# Patient Record
(demographics unavailable — no encounter records)

---

## 2024-12-18 NOTE — PHYSICAL EXAM
[de-identified] : Constitutional o Appearance : well-nourished, well developed, alert, in no acute distress  Head and Face o Head :  Inspection : atraumatic, normocephalic o Face :  Inspection : no visible rash or discoloration Respiratory o Respiratory Effort: breathing unlabored  Neurologic o Mental Status Examination :  Orientation : alert and oriented X 3 Psychiatric o Mood and Affect: mood normal, affect appropriate Cardiovascular o Observation/Palpation : - no swelling Lymphatic o Additional Nodes : No palpable lymph nodes present  Right Lower Extremity o Buttock : no tenderness, swelling or deformities  o Right Hip :  Inspection/Palpation : no tenderness, swelling or deformities  Range of Motion : full and painless in all planes, no crepitance  Stability : joint stability intact  Strength : extension, flexion, adduction, abduction, internal rotation and external rotation 5/5   o Right Knee :  Inspection/Palpation :  medial compartment tenderness and lateral tenderness to palpation, no swelling  Range of Motion : 3-100, active flexion and extension full and painless, no crepitance decreased patellofemoral glide   Stability : mild valgus or varus instability present on provocative testing  Strength : flexion and extension 5/5  Tests and Signs : negative Anterior Drawer, negative Lachman, negative Oscar  Left Lower Extremity o Buttock : no tenderness, swelling or deformities  o Left Hip :  Inspection/Palpation : no tenderness, no swelling or deformities  Range of Motion : full and painless in all planes, no crepitance  Stability : joint stability intact  Strength : extension, flexion, adduction, abduction, internal rotation and external rotation 5/5  o Left Knee :  Inspection/Palpation : medial compartment  tenderness to palpation, no swelling  Range of Motion : 0-60   with pain active flexion and extension full, no crepitance  Stability : no valgus or varus instability present on provocative testing  Strength : flexion and extension 5/5  Tests and Signs : negative Anterior Drawer, negative Lachman, negative Oscar  o Peripheral Vascular System :  Dorsalis Pedis Artery : pulse 2+  Posterior Tibial Artery : pulse 2+   Gait and Station: Gait: trophic changes of both lower extremities, no foot drop, no significant extremity swelling or lymphedema, good proprioception and balance, varus thrust bilaterally right side worse than left.  o Knee injection : Indication- knee osteoarthritis, Anatomic location- right intra-articular joint space, Spray - area was sterilized with Betadine and alcohol and anesthetized with Ethyl Chloride , needle used-20G, Medications given- 6cc's Synvisc-One under Ultrasound guidance. The patient tolerated the procedure well.

## 2024-12-18 NOTE — ADDENDUM
[FreeTextEntry1] : I, HOMERO BARBOSA, acted solely as a scribe for Dr. Eran Drew on this date 12/18/2024.  All medical record entries made by the Scribe were at my, Dr. Eran Drew, direction and personally dictated by me on 12/18/2024. I have reviewed the chart and agree that the record accurately reflects my personal performance of the history, physical exam, assessment and plan. I have also personally directed, reviewed, and agreed with the chart.

## 2024-12-18 NOTE — DISCUSSION/SUMMARY
[de-identified] : I went over the pathophysiology of the patient's symptoms in great detail with the patient. I discussed the underlying pathophysiology of the patient's condition in great detail with the patient. I went over the patient's x-rays with them in great detail. I informed the patient that surgery will be required to provide them long term relief from their symptoms. The proper pre and post operative procedures and expectations were discussed in extensive detail with the patient. We had a lengthy discussion about the patient's issues, and talked about the benefits and risks of the procedure. I provided the names of 3 doctors. The patient elected to receive a Synvisc-One injection into her right knee today, and tolerated it well. I instructed the patient on ROM exercises, and told them to take it easy. The use of ice and rest was reviewed with the patient. The patient may resume activities tomorrow. I reminded the patient that it takes 4 to 6 weeks after the final injection to feel symptom relief.   All of their questions were answered. They understand and consent to the plan.   FU in 6 weeks.   The patient is an 66 year old female with bone on bone arthritis of their [right / left /bilateral] knee. Based upon the patient's continued symptoms and failure to respond to conservative treatment I have recommended a total knee replacement for this patient. A long discussion took place with the patient describing what a total joint replacement is and what the procedure would entail. A total knee model, similar to the implant that will be used during the operation was utilized to demonstrate and to discuss the various bearing surfaces of the implants. The hospitalization and post-operative care and rehabilitation were also discussed. The use of perioperative antibiotics and DVT prophylaxis were discussed. The risk, benefits and alternatives to a surgical intervention were discussed at length with the patient. The patient was also advised of risks related to the medical comorbidities and elevated body mass index (BMI). A lengthy discussion took place to review the most common complications including but not limited to: deep vein thrombosis, pulmonary embolus, heart attack, stroke, infection, wound breakdown, numbness, damage to nerves, tendon, muscles, arteries or other blood vessels, death and other possible complications from anesthesia. The patient was told that we will take steps to minimize these risks by using sterile technique, antibiotics and DVT prophylaxis when appropriate and follow the patient postoperatively in the office setting to monitor progress. The possibility of recurrent pain, no improvement in pain and actual worsening of pain were also discussed with the patient.  The discharge plan of care focused on the patient going home following surgery. I encouraged the patient to make the necessary arrangements to have someone stay with them when they are discharged home. Following discharge, a home care nurse will visit the patient. They will open your home care case and request home physical therapy services. Home physical therapy will commence following discharge provided it is appropriate and covered by her health insurance benefit plan.  The risks, benefits and alternative treatment options of total joint replacement were reviewed with the patient at great length. All questions were answered to the satisfaction of the patient. The patient participated in an interactive discussion of the total knee replacement implant planned for their surgery with questions answered. The patient agreed with the treatment plan, and has decided to move forward with the elective total knee replacement as planned.  VIKA PARSONS was seen face to face and needs a commode for bedside use as the patient has no ability to acces the bathroom in their home. The patient also requires a rolling walker as this is needed for activities of daily living within their home secondary to the diagnosis of osteoarthritis.

## 2024-12-18 NOTE — HISTORY OF PRESENT ILLNESS
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 She states her right knee is feeling better. She is s/p right knee cortisone injection on 11/21/2024 and reports relief for a couple of weeks. She states the right knee is worse than the left knee. She denies any swelling or buckling. She understands she needs to a right TKR. She has restricted motion of her bilateral knees.  She has been approved for Synvisc-One.  Her left knee does not hurt but has severe restriction of motion.

## 2025-01-22 NOTE — DISCUSSION/SUMMARY
[de-identified] : 66 year female old with right and left knee osteoarthritis. We discussed at length the nature of the patient's condition. We discussed all options and conservative measures, such as ice, NSAIDs, physical therapy, exercise limitations, and injections.  Discussed NSAIDs prn. Recommended use of a cane.   Recommended home exercise program.  I discussed with patient that if and when patient feels quality of life is significant compromised and unresponsive to conservative management patient will be considered a candidate for total hip replacement.  Follow up 6 months  The patient understood and verbally agreed to the treatment plan. All of their questions were answered. The patient should call the office if they have any questions or experience worsening symptoms.

## 2025-01-22 NOTE — ADDENDUM
[FreeTextEntry1] : I, Jovita Estevez, acted solely as a scribe for Dr. Vega Galan MD on this date  01/22/2025  All medical record entries made by the Scribe were at my, Dr. Vega Galan MD , direction and personally dictated by me on 01/22/2025. I have reviewed the chart and agree that the record accurately reflects my personal performance of the history, physical exam, assessment and plan. I have also personally directed, reviewed, and agreed with the chart.

## 2025-01-22 NOTE — PHYSICAL EXAM
[de-identified] : Constitutional: Well nourished , well developed and in no acute distress Psychiatric: Alert and oriented to time place and person.Appropriate affect . Skin: Head, neck, arms and lower extremities:no lesions or discoloration HEENT: Normocephalic, EOM intact, Nasal septum midline, Respiratory: Unlabored respirations,no audible wheezing ,no tachypnea, no cyanosis Cardiovascular: No leg swelling no ankle edema no JVD, pulse regular Vascular: No calf or thigh tenderness, Peripheral pulses: intact Lymphatics: No groin adenopathy,no lymphedema lower or upper extremities   o Right Knee Exam: Inspection/Palpation : no tenderness to palpation, no swelling, no deformity Range of Motion : 0 - 95 degrees, crepitus Stability : no valgus or varus instability present on provocative testing, Lachmans Test (-) Motor Strength: Peroneals, EHL, and tibialis anterior 5/5 Reflexes: Patella 2+ R=L, Achilles 2+ R=L Sensation : sensation to pin intact Vascular Exam : no edema, no cyanosis, dorsalis pedis artery pulse 2+ Skin : no erythema, no ecchymosis, venous stasis pigmentation changes   o Left Knee Exam: Inspection/Palpation : no tenderness to palpation, no swelling, no deformity Range of Motion : 0 - 75 degrees, crepitus Stability : no valgus or varus instability present on provocative testing, Lachmans Test (-) Motor Strength: Peroneals, EHL, and tibialis anterior 5/5 Reflexes: Patella 2+ R=L, Achilles 2+ R=L Sensation : sensation to pin intact Vascular Exam : no edema, no cyanosis, dorsalis pedis artery pulse 2+ Skin : no erythema, no ecchymosis, venous stasis pigmentation changes [de-identified] : Patient comes to today's visit with outside imaging already performed. I reviewed the images in detail with the patient and discussed the findings as highlighted below.  o 4 views of the  RIGHT & LEFT knee obtained 07/12/2024 demonstrates advanced tricompartmental osteoarthritis with medial compartment degenerative narrowing with a bone on bone opposition, marginal osteophyte formation, varus alignment

## 2025-01-27 NOTE — HISTORY OF PRESENT ILLNESS
[FreeTextEntry1] : 65 yo  menopause age 54 presents for annual exam.  H/o of Uterine fibroids w/o symptoms No PMB [Mammogramdate] : 4 [PapSmeardate] : 4 [ColonoscopyDate] : appt 2/25

## 2025-01-27 NOTE — DISCUSSION/SUMMARY
[FreeTextEntry1] :  I spent the time noted on the day of this patient encounter preparing for, providing and documenting the above service. I have counseled and educated the patient on the differential, workup, disease course, and treatment/management plan. Education was provided to the patient during this encounter. All questions and concerns were answered and addressed in detail.    Never

## 2025-01-27 NOTE — HISTORY OF PRESENT ILLNESS
[FreeTextEntry1] : 67 yo  menopause age 54 presents for annual exam.  H/o of Uterine fibroids w/o symptoms No PMB [Mammogramdate] : 4 [PapSmeardate] : 4 [ColonoscopyDate] : appt 2/25

## 2025-01-27 NOTE — COUNSELING
[Nutrition/ Exercise/ Weight Management] : nutrition, exercise, weight management [Breast Self Exam] : breast self exam [Bladder Hygiene] : bladder hygiene [Vitamins/Supplements] : vitamins/supplements

## 2025-01-27 NOTE — PHYSICAL EXAM
[Chaperone Declined] : Patient declined chaperone [Appropriately responsive] : appropriately responsive [Alert] : alert [No Acute Distress] : no acute distress [No Lymphadenopathy] : no lymphadenopathy [Regular Rate Rhythm] : regular rate rhythm [Soft] : soft [Non-tender] : non-tender [Non-distended] : non-distended [No HSM] : No HSM [No Lesions] : no lesions [No Mass] : no mass [Oriented x3] : oriented x3 [Examination Of The Breasts] : a normal appearance [No Discharge] : no discharge [No Masses] : no breast masses were palpable [Labia Majora] : normal [Labia Minora] : normal [No Bleeding] : There was no active vaginal bleeding [Normal] : normal [Uterine Adnexae] : normal [Atrophy] : atrophy [Enlarged ___ wks] : enlarged [unfilled] ~Uweeks

## 2025-03-04 NOTE — HISTORY OF PRESENT ILLNESS
[FreeTextEntry1] : Since her last visit with me, she has been following with GI and was advised that she needs to have EGD and colonoscopy.  This is scheduled for next week, she was advised to get cardiac clearance. Endorses that she has been active, going to the gym on a regular basis.  Denies any cardiopulmonary limitations.  No complaints of chest pain or chest pressure.  No shortness of breath or dyspnea on exertion.  No palpitations, dizziness, lightheadedness, syncope near syncope.  No edema. No orthopnea.  No PND.  She has lost weight Remains on anticoagulation with Xarelto.  Denies any excessive ecchymosis, bleeding, black or bloody stools, hematuria or epistaxis.

## 2025-03-04 NOTE — PHYSICAL EXAM
[Obese] : obese [Normal S1, S2] : normal S1, S2 [No Rub] : no rub [No Gallop] : no gallop [Murmur] : murmur [Normal] : alert and oriented, normal memory [de-identified] : l/Vl systolic

## 2025-03-04 NOTE — DISCUSSION/SUMMARY
[FreeTextEntry1] : 66-year-old woman presenting for preprocedure cardiac clearance.  Patient being considered for EGD and colonoscopy. She has history of paroxysmal atrial flutter, remains in sinus rhythm.  Anticoagulated.  Hypertension and abnormal EKG. She has a good functional capacity has been asymptomatic with respect to cardiac issues. On physical examination today, blood pressure is stable.  She appears euvolemic.  No new cardiac murmurs rubs or gallops are noted. EKG is sinus rhythm, poor R wave progression, nonspecific ST changes.  Largely unchanged from prior. There is no evidence of recent myocardial infarction, congestive heart failure or malignant cardiac arrhythmia. The current cardiac status appears to be stable.  Plan 1.  Planned EGD and colonoscopy can proceed at acceptable cardiac risk.  There are no cardiac contraindications to proceeding with the procedure. 2.  She was advised by GI, as per their protocol, she needs to hold anticoagulation with Xarelto for 2 days prior to the procedure to minimize bleeding risk.  This can be done at an acceptable cardiac risk.  Resume anticoagulation as soon as it is safe from a GI standpoint. 3.  No other changes in medications. 4.  She will follow-up with me in the office at her next scheduled visit. 5.  The above was reviewed with the patient and all of her questions were answered to her satisfaction. [EKG obtained to assist in diagnosis and management of assessed problem(s)] : EKG obtained to assist in diagnosis and management of assessed problem(s)

## 2025-03-04 NOTE — REASON FOR VISIT
[FreeTextEntry1] : Cardiology follow-up visit for preprocedure cardiac clearance, patient being considered for EGD and colonoscopy.  She has paroxysmal atrial flutter, anticoagulated, hypertension, abnormal EKG.

## 2025-03-04 NOTE — CARDIOLOGY SUMMARY
[___] : [unfilled] [de-identified] : March 4, 2025.  Normal sinus rhythm, poor R wave progression, nonspecific ST changes  [de-identified] : July 2 2019.  Poor exercise performance without chest pain. Normal hemodynamic and EKG response to exercise.  [de-identified] : April 26, 2023  Normal LV function.  Small pericardial effusion. Moderate LAE

## 2025-04-01 NOTE — HISTORY OF PRESENT ILLNESS
[FreeTextEntry1] : Since last visit with me, she did have colonoscopy and EGD done.  These procedures went well without any complications. She is gone back to her gym, working out regularly.  She occasionally feels "cramps" in her chest when she is lifting weights.  However, no similar symptoms when she is doing aerobic exercise or going on the treadmill. She did have 1 episode of feeling dizzy, occurred randomly and lasted for a few seconds and then resolved on its own. No shortness of breath.  No palpitations.  No syncope.  No edema.  No orthopnea.  No PND.

## 2025-04-01 NOTE — CARDIOLOGY SUMMARY
[___] : [unfilled] [de-identified] : April 1, 2025.    Normal sinus rhythm, poor R wave progression, nonspecific ST changes  [de-identified] : July 2 2019.  Poor exercise performance without chest pain. Normal hemodynamic and EKG response to exercise.  [de-identified] : April 1, 2025.  Normal left ventricular systolic function, mild left atrial enlargement.

## 2025-04-01 NOTE — PHYSICAL EXAM
[Obese] : obese [Normal S1, S2] : normal S1, S2 [No Rub] : no rub [No Gallop] : no gallop [Murmur] : murmur [Normal] : alert and oriented, normal memory [de-identified] : l/Vl systolic

## 2025-04-01 NOTE — DISCUSSION/SUMMARY
[FreeTextEntry1] : 67-year-old woman with paroxysmal atrial flutter, remains in sinus rhythm.  Anticoagulated.  Hypertension, abnormal EKG. She has a good functional capacity. She describes an episode of chest pain that appears to have mostly atypical features. Physical examination today, blood pressure is stable.  She appears euvolemic.  No new cardiac murmurs rubs or gallops are noted. Echocardiogram done today in the office.  Normal left ventricular systolic function, mild left atrial enlargement EKG is normal sinus rhythm, poor R wave progression, nonspecific ST changes.  Plan 1.  Exercise stress test to evaluate functional capacity and evaluate for stress-induced coronary insufficiency. 2.  Current medications reviewed, no changes. 3.  Next clinical visit with me in the office in 6 months. 4.  Advised to monitor for any cardiac symptoms and to report back to me for any changes or if there are any other concerns.  Cardiac issues were discussed and all questions answered.

## 2025-04-01 NOTE — REASON FOR VISIT
[FreeTextEntry1] : Cardiology follow-up visit for evaluation management of paroxysmal atrial flutter, anticoagulated, hypertension, abnormal EKG.

## 2025-06-14 NOTE — HISTORY OF PRESENT ILLNESS
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 Patient presents ambulating independently. Patient reports pain for years intermittently worsening over time. She was under the care of Dr Drew for knee pain, and is s/p right knee cortisone injection on 11/21/2024 and on 12/18/24 a Synvisc-One injection into her right knee, and reports relief for a couple of weeks but pain has since returned as of 1/21/25. She states the right knee is worse than the left knee. The patient describes the pain localized to the anterior aspect of the R> L knees that is intermittent in nature. The symptoms are provoked by bending the knee, getting up from a seated position, walking, and going up and down the stairs. Pain is alleviated at rest. Denies any swelling, locking, or giving out of the knee. Denies any numbness or tingling of the lower extremities. Denies any injections for the left knee in the past. She has completed courses of PT for her knees in the past noting symptom relief. Patient is going to [facility] in May.  Of note, as per patient used to be 400 pounds, has undergone surgery for this.
no

## 2025-06-24 NOTE — PHYSICAL EXAM
[Normal] : no peripheral adenopathy appreciated [de-identified] : No cervical, axillary or inguinal LAD noted

## 2025-06-24 NOTE — HISTORY OF PRESENT ILLNESS
[de-identified] : Initial Consultation on 6/24/2025 Referred by: Accompanied by: CC: Leukopenia   HPI: 67 year old woman with hypertension and PAF here for evaluation of leukopenia/neutropenia.    Patient was previously evaluated by hematology, Dr. Monika Fernandez, in May 2019.     Hospitalizations: Denies     Medications: See List.  Medications reconciled   Allergies: NKDA   Social History: . Works as a Denies smoking Occasional alcohol use [place] in [place] Parents are from   Family History: Mother Father   Healthcare Maintenance: Primary care doctor- Dr. Gurwinder Lin Last colonoscopy- Last endoscopy- Last mammogram- Last gyn exam-    [de-identified] : Initial Visit

## 2025-06-26 NOTE — HISTORY OF PRESENT ILLNESS
[de-identified] : Initial Consultation on 6/24/2025 Referred by: Dr. Lin, primary care physician CC: Leukopenia   HPI: 67-year-old woman with asthma, gastric bypass (2010), hypertension, hyperlipidemia, paroxysmal atrial flutter on Xarelto, chronic constipation, GERD, depression and fibroids here for evaluation of leukopenia/neutropenia.   Patient was previously evaluated by hematology, Dr. Monika Fernandez, in May 2019 for leukopenia.  The workup was negative except for a flow cytometry that showed a slightly increased proportion of TCR gamma/delta T cells.  Recent CBC on 5/27/2025 showed WBC of 3.18, Hgb 11.2, HCT 34.7, MCV 90.6, and platelets of 330,000.  Differential showed a decreased neutrophil percentage to 37.3% with a decreased ANC of 1.19 with an increased lymphocyte percentage to 47.5% with an ALC of 1.51 (which is normal).  In the EMR, intermittent leukopenia has been noted since at least 5/2015.  Intermittent anemia has been present since at least April 2016 with lowest hemoglobin of 9.3 in April 2022.  Her last colonoscopy and endoscopy were 1-2 months ago and reportedly were normal.  She feels good overall.  She reports a good appetite with 130-pound deliberate weight loss over the past 15 months which she attributes to intermittent fasting.  She complains of occasional bilateral lower extremity edema. Patient denies any fever/chills, recent infections, CP, palpitations, SOB, abdominal pain, n/v/d, bloody/black stools, frequent headaches, dizziness, night sweats or swollen glands.   Hospitalizations: Denies  Medications: See List.  Medications reconciled   Allergies: PCN Shellfish   Social History: .  Has 2 children. 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 Denies smoking or alcohol use. Born in [place] Her mother is from the [facility], and her father is from the [place]. She eats a regular diet without restrictions.   Family History: She denies a family history of cancer.   Healthcare Maintenance: Primary care doctor- Dr. Gurwinder Lin-last seen a few weeks ago. Last colonoscopy- 1-2 months ago, benign polyps removed. Last endoscopy- 1-2 months ago, normal.  Last mammogram-2 to 3 months ago.  Has a history of a left breast density which they are following.  She is scheduled for a repeat mammogram in September 2025 Last gyn exam- 2 months ago, normal.  Cardiology- Dr. Drew Carpenter GI- Dr. Cordero Recent stress test in April 2025 was normal  [de-identified] : Initial Visit

## 2025-06-26 NOTE — ASSESSMENT
[FreeTextEntry1] :  67-year-old woman with asthma, gastric bypass (2010), hypertension, hyperlipidemia, paroxysmal atrial flutter on Xarelto, chronic constipation, GERD, depression and fibroids here for evaluation of leukopenia/neutropenia.   Patient was previously evaluated by hematology, Dr. Monika Fernandez, in May 2019 for leukopenia.  The workup was negative except for a flow cytometry that showed a slightly increased proportion of TCR gamma/delta T cells.  Recent CBC on 5/27/2025 showed WBC of 3.18, Hgb 11.2, HCT 34.7, MCV 90.6, and platelets of 330,000.  Differential showed a decreased neutrophil percentage to 37.3% with a decreased ANC of 1.19 with an increased lymphocyte percentage to 47.5% with an ALC of 1.51 (which is normal).  In the EMR, intermittent leukopenia has been noted since at least 5/2015.  Intermittent anemia has been present since at least April 2016 with lowest hemoglobin of 9.3 in April 2022.  Her last colonoscopy and endoscopy were 1-2 months ago and reportedly were normal.  Plan: - Will do work-up of leukopenia.  Will check CBC, CMP, B12, Folate, MMA, Hepatitis B/C, HIV, flow cytometry, Kenney Null, TSH, Copper and CRP/ESR.  - Will do work-up of anemia today.  Will check CBC, CMP, iron studies, Ferritin, hemoglobin electrophoresis and alpha globin mutation.  - Patient has been advised to call me in one week to discuss all laboratory results. All questions answered.

## 2025-07-22 NOTE — PHYSICAL EXAM
[de-identified] : Constitutional o Appearance : well-nourished, well developed, alert, in no acute distress  Head and Face o Head :  Inspection : atraumatic, normocephalic Neurologic o Mental Status Examination :  Orientation : alert and oriented X 3 Psychiatric o Mood and Affect: mood normal, affect appropriate  Cardiovascular o Observation/Palpation : - no swelling,normal pulses, normal temperature  Lymphatic o Additional Nodes : No palpable lymph nodes present   Cervical Spine o Musculoskeletal Examination : full flexion and rotation of the cervical spine. No paracervical tenderness. o Inspection/Palpation :  Inspection : alignment midline, normal degree of lordosis present  Skin : normal appearance, no masses or tenderness, trachea midline  Palpation : musculature is nontender to palpation o Range of Motion : arc of motion full in all planes, no crepitance or pain with ROM   Right Upper Extremity o Shoulder :  Inspection/Palpation : no tenderness, swelling or deformities  Range of Motion : full and painless in all planes, no crepitance  Strength : forward elevation, internal rotation, external rotation, adduction and abduction 5/5  Stability : no joint instability on provocative testing  Tests: Jones negative, Neer negative  Left Upper Extremity o Shoulder :  Inspection/Palpation : no tenderness, swelling or deformities  Range of Motion : 0-90 full and painless in all planes, no crepitance, pain with eccentric.  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

## 2025-07-22 NOTE — DISCUSSION/SUMMARY
[de-identified] : I discussed the underlying pathophysiology of the patient's condition in great detail with the patient. I went over the patient's x-rays with them in great detail. The extent of the patient's arthritis was discussed in great detail with them. We went over the wide ranging benefits of exercise for the patient health and I encouraged her to begin a diligent exercise program. The patient was instructed in ROM exercises they are to do at home, and I recommended her to buy an over the door pulley for exercsing. At-home strengthening exercises were discussed and demonstrated with the patient. She  should focus on light weight and high repetition exercises. I stressed the importance of weight loss and its benefits to the patient's joints and overall health. The benefits of pool exercises were discussed in detail with the patient.  We have told her that the shoulder will not come out as the motion is too restricted.  However it is very important to move the shoulder so she does not lose more motion.  The only solution to her problem eventually is a total shoulder replacement.  She claims that she has no pain right now and is not interested in surgery.  All of their questions were answered. They understand and consent to the plan. FU in-6 weeks.

## 2025-07-22 NOTE — ADDENDUM
[FreeTextEntry1] : I, DARYN SANTOSSSEF, acted solely as a scribe for Dr. Eran Drew on this date 07/22/2025. All medical record entries made by the Scribe were at my, Dr. Eran Drew, direction and personally dictated by me on 07/22/2025. I have reviewed the chart and agree that the record accurately reflects my personal performance of the history, physical exam, assessment and plan. I have also personally directed, reviewed, and agreed with the chart.